# Patient Record
(demographics unavailable — no encounter records)

---

## 2025-02-04 NOTE — HISTORY OF PRESENT ILLNESS
[FreeTextEntry1] : March 8, 2022.  Patient is an 84-year-old man who came to this country from [place] in 1962, was in show business and a dancer and met his wife in a [employer] performance of [employer] 51 years ago.  Healthy with no real heart issues although was followed by Dr. Elvin Yang for a number of years, mostly for hyperlipidemia and did have mild MVP with mild MR on an echo in 2016.  No family history of coronary disease, no hypertension or diabetes although A1c slightly borderline.  On atorvastatin for years with excellent lipid profile.  In November 2021 both he and his wife came down with COVID.  On November 17 the patient fell and fractured his hip and had surgery on the 18th at [facility].  Reportedly in the ER he was in atrial fibrillation and he was placed on Eliquis and Cardizem  mg daily.  Eventually went to rehab and then came home and had his follow-up with Dr. Garces and Dr. Macias.  Rhythm was regular and he was advised to see a cardiologist and referred here.  Here with his wife and he is in sinus rhythm.  In general he denies exertional chest pain, shortness of breath, palpitations, syncope or near syncope.  He was on aspirin and atorvastatin for a previous TIA in 2017 that was mild but reportedly no atrial fibrillation then. March 9, 2022.Received records from [facility].  There is an echo report dated November 22, 2021.  Normal LV size and function with LVEF 65 to 70%.  Diastolic dysfunction present.  Normal RV size and function.  Mild MR.  Mild AI.  No TR.  No pericardial effusion.  Of interest the atrial fibrillation was postop as he initially presented in sinus rhythm.  March 31, 2022.  Patient returns in follow-up.  Is in sinus rhythm at 70.  Low voltage in the limb leads and one VPC. Left anterior hemiblock.  He feels well with no complaints.  He is off the Eliquis but still on the diltiazem.  Blood pressure is excellent. October 4, 2022.  Patient returns in follow-up.  Here with his son and clearly depressed as his wife of more than 50 years passed away about 2 months ago after a long hospitalization.  Still in sinus rhythm at 70 with Q waves in 3 and aVF.  Most recent labs were at the end of May and his hemoglobin A1c was up to 6.8.  Excellent lipid profile then.  His proBNP back in March was elevated and his echo from [facility] did show some diastolic dysfunction and mild MR.  This will be repeated today.  At the end of the visit the son asked about him getting a smart watch and I told him that was a good idea and gave them a sample box of Eliquis to have available in case the watch should tell him that he is back in atrial fibrillation, he could start anticoagulation until he is able to get to a physician's office to confirm. Labs were okay except mild elevation in his LFTs, proBNP around 460, excellent lipid profile with HDL 68 and LDL 50.  Hemoglobin A1c 6.4. He saw Dr. Garces his primary care physician who noted a black lesion on his scalp and referred him to dermatology. April 4, 2023.  Patient returns in follow-up.  Remains in sinus rhythm at 69 with a mild first-degree AV block and otherwise unremarkable except for Q waves in lead III and aVF.  Sinus arrhythmia noted as well.  Patient slow to answer but seems oriented, here with his grandson.  Possibly still depressed and or grieving.  No complaints of chest pain shortness of breath palpitations etc.  Says he had COVID for about 3 weeks around the program at time but not severe and he was not placed on Paxlovid etc. says his appetite is not great but he has not lost weight.  He remains on the same medication so his labs including LFTs will be checked again today.  Last echo August 2016 and had some mitral valve prolapse and regurgitation so this will be repeated with his next visit. August 8, 2023.  Patient returns in follow-up and for echocardiogram.  The echo has normal wall motion, probably no small wall motion abnormality inferiorly, with mild to moderate MR and AI.  Diastolic function still seen.  He has no complaints but he does seem to talk a little slow and maybe a little confusion at times.  We reviewed some of his medications and when to take them and we talked about his diltiazem and his atrial fibrillation.  He is still on Eliquis. August 8, 2024.  Patient returns exactly 1 year later for follow-up and echocardiogram again.  He is now 86 years old.  Here with his grandson.  Perhaps has slowed down a little more and refuses to wear his hearing aids.  No interval medical or cardiac issues except may be COVID.  Denies change in medication.  Blood pressure excellent.  EKG is sinus rhythm at 60 with occasional VPCs.  Mild abnormalities with J-point elevation in V1 and slightly in V2 with some ST depressions sloping downward V4 through 6 all unchanged.  He had carotid Doppler February 14 by neurology and had less than 50% stenosis bilaterally.  He denies exertional chest pain shortness of breath etc.  Now and then the grandson says he complains about being a little dizzy but it totally comes out of the blue and at random, and never near syncopal or with syncope. February 4, 2025.  Patient returns for follow-up and for echocardiogram as the BNP had gone up a little last visit.  He is here with one of the grandsons who does not live with him but does keep involved occasionally.  The patient seems more out of it and less oriented, complaining of generalized weakness, complaining of dizziness but no syncope or near syncope, and no real severe shortness of breath.  He looks a little disheveled.  His daughter has him seeing a new doctor who they think is an immunologist who helped her a lot and he did change some medications possibly stopping his statin, and he did send off a bunch of bloods recently so they did not want blood work today.  His echocardiogram was for the most part unchanged with mild MR, mild to moderate AI, mild TR with PAS 24, normal LV systolic function but abnormal diastolic function, and ascending aorta measuring 4 cm which is unchanged.

## 2025-02-04 NOTE — DISCUSSION/SUMMARY
[FreeTextEntry1] : They will try to get me the notes and the most recent labs from the other physician.  In the meantime I told him that from a cardiac point of view he is stable and he could follow-up here in 6 months. [EKG obtained to assist in diagnosis and management of assessed problem(s)] : EKG obtained to assist in diagnosis and management of assessed problem(s)

## 2025-02-04 NOTE — PHYSICAL EXAM
[Well Developed] : well developed [Well Nourished] : well nourished [No Acute Distress] : no acute distress [Normal Conjunctiva] : normal conjunctiva [Normal Venous Pressure] : normal venous pressure [No Carotid Bruit] : no carotid bruit [Normal S1, S2] : normal S1, S2 [No Rub] : no rub [No Gallop] : no gallop [Clear Lung Fields] : clear lung fields [Good Air Entry] : good air entry [No Respiratory Distress] : no respiratory distress  [Soft] : abdomen soft [Non Tender] : non-tender [No Masses/organomegaly] : no masses/organomegaly [Normal Bowel Sounds] : normal bowel sounds [Normal Gait] : normal gait [No Cyanosis] : no cyanosis [No Clubbing] : no clubbing [No Varicosities] : no varicosities [Normal Radial B/L] : normal radial B/L [Normal PT B/L] : normal PT B/L [Normal DP B/L] : normal DP B/L [No Rash] : no rash [No Skin Lesions] : no skin lesions [Moves all extremities] : moves all extremities [No Focal Deficits] : no focal deficits [Alert and Oriented] : alert and oriented [Normal memory] : normal memory [Lethargic] : lethargic [de-identified] : Slight click and a 1/6 systolic murmur mostly apical.  Soft JENNIE as well. [de-identified] : No ankle edema bilaterally.  Normal pedal pulses today [de-identified] : May be slow speech. [de-identified] : Seems "out of it" and just generalized lethargy and or generalized depression

## 2025-02-04 NOTE — ASSESSMENT
[FreeTextEntry1] : 2022 Overall the patient seems healthy. Most likely the atrial fibrillation was related to the acute stress and trauma of the hip fracture and possibly related to Covid (from records received turns out he presented in sinus rhythm and the atrial fibrillation was only postop). Currently is still in sinus rhythm. The rest of the ECG has a left anterior hemiblock and borderline Q waves in III and aVF; otherwise within normal limits. Usually after a first-time event for A. fib with a clear precipitant, we would continue anticoagulation for 3 months and then reevaluate. At initial visit it was 3 months and the patient and his wife were nervous because of a friend who had an intracerebral bleed from Eliqu. They were not sure if he had an echo while at [facility]. I decided to continue the Cardizem LA but stop the Eliquis. He remained on baby aspirin for his previous TIA. I was able to obtain results of an echo from [facility]--mild MR, mild AI, otherwise normal. He remains in sinus rhythm again here today. He will remain off Eliquis and we discussed back and forth whether to continue Cardizem. Currently tolerating it well, he is 85 years old with some mitral valve disease, so perhaps it will help keep him out of atrial fibrillation. Therefore we will continue it for now and only reevaluate if he develops any side effects etc. no complaints of constipation and no edema so far. Currently I do not feel the need for long-term monitoring yet. Last visit his son asked about using a smart watch which I think is a good idea and I gave them a sample box of Eliquis that he could start if there is a question of atrial fibrillation until he gets to see a physician. Clearly still grieving for his wife who passed away 8 months ago after a marriage of over 50 years. Otherwise seems stable. He had mildly abnormal last time so these will be repeated today. If still abnormal I will let Dr. Garces working up. If no new issues I would like to see him with an echocardiogram in 4 months time. August 8, 2023. Patient returned and seems stable, still a little slowed mental status wise and may be a little confused. No recurrence of atrial fibrillation as far as we can tell. Echo seems mostly unchanged, may be a little more MR and AI. Normal LV and RV function. Does have diastolic dysfunction. Discussed Cardizem and I would continue it for now. Discussed that he can take his statin anytime of day and labs will be checked including lipids and BNP. EKG remains sinus rhythm with Q's in 3 and aVF. Patient returned again August 8, 2024 exactly a year later. Here with his grandson. Seems to have slowed down a little although that is what I said a year ago. Remains in sinus rhythm. Blood pressure is excellent. Denies any chest pain or shortness of breath or near syncope or palpitations. Echo virtually unchanged with normal LV and RV function, mild to moderate AI, mild MVP of the posterior leaflet with mild MR may be mild to moderate. EKG is unchanged with some minor abnormalities as described but sinus rhythm with an occasional VPC. No signs on exam of fluid overload or congestive heart failure.  Patient returns today February 4, 2025 together with one of his grandsons once again.  He has never really recovered since his wife passed away and he seems to be progressively slowing down with somewhat of a more lethargic mental status, complaining about very generalized weakness, some dizziness that is all nonspecific.  His daughter took him to a new doctor, they think it is an immunologist, and he made some changes in his medications and he did some lab work that is pending so they did not want labs done today.  I told the grandson that that doctor has to either call me or send me his notes and lab results so I can understand where things are at.  His physical exam and his EKG are unchanged; the EKG remains abnormal with some mild ST downsloping depressions in leads II, aVF, V4 through 6.  The patient's echo is unchanged; I do not really see a definite prolapse and the valve today but he does have mild MR, there is still normal systolic function and mild diastolic dysfunction.  There is mild TR with PAS 24 and there is mild to moderate AI, mostly unchanged.  I tried to reassure them is much as possible but the patient seems to be going downhill and perhaps it is just severe depression.

## 2025-02-04 NOTE — CARDIOLOGY SUMMARY
[de-identified] : July 6, 2016.  Done at [facility].  Mild prolapse of anterior mitral leaflet with mild MR.  Mildly calcified trileaflet aortic valve with normal opening and no AI.  Aortic root 4.2 cm.  Normal left atrium.  Normal LV size and function with LVEF 64%.  Normal diastolic function.  Normal RV size and function with mild TR.  Normal pericardium with no effusion. November 21, 2017.  Done at [facility].  Normal LV size with LVEF 55 to 60%.  Normal RV size and function.  Normal left atrium.  Normal right atrium.  Mild dilatation of ascending aorta and aortic root.  Trileaflet aortic valve with mild AI.  Trace MR.  No TR.  No pericardial effusion.  November 22, 2021.  Done at [facility].  Normal LV size with LVEF 55 to 60%.  Normal RV size and function.  Mild MR.  Mild AI.  No TR.  No pericardial effusion. August 8, 2023.  Mild mitral prolapse with mild to moderate MR.  Tricuspid aortic valve with normal opening and mild to moderate AI.  Normal left atrium.  Normal LV size and systolic function with LVEF 60%.  Normal RV size and function with trace TR.  No pericardial effusion seen N

## 2025-02-04 NOTE — REVIEW OF SYSTEMS
[Hearing Loss] : hearing loss [Depression] : depression [Negative] : Heme/Lymph [Feeling Fatigued] : feeling fatigued [Weight Loss (___ Lbs)] : [unfilled] ~Ulb weight loss [Weight Gain (___ Lbs)] : no recent weight gain [Lower Ext Edema] : no extremity edema [Suicidal] : not suicidal [FreeTextEntry9] : Fractured right hip and ? still has a little limp. [de-identified] : see HPI

## 2025-04-29 NOTE — PHYSICAL EXAM
[Capable of only limited self care, confined to bed or chair more than 50% of waking hours] : Status 3- Capable of only limited self care, confined to bed or chair more than 50% of waking hours [Thin] : thin [Normal] : affect appropriate [de-identified] : Left clavicle fracture  [de-identified] : uses walker, h/o frequent falls, depedent of most ADLs [de-identified] : b/l knee abrasions, Left shoulder bruise, jaundice  [de-identified] : motor and speech impairment since 2021, dizziness

## 2025-04-29 NOTE — HISTORY OF PRESENT ILLNESS
[de-identified] : Mr. Schwartz is an 88yo M with newly diagnosed Cholangiocarcinoma.  CT AP 4/9/2025 obtained for hyperbilirubinemia which demonstrated mild intrahepatic biliary dilatation with prominent proximal CBD, no obvious mass. Marked dilation of the gallbladder with mild pericholecystic edema.   Follow up MRCP 4/10/2025 :  Mild intra and extrahepatic ductal dilatation.  CBD 0.8cm. Cystic Duct dilated to 1.0 cm.  Focal stenosis of CBD at panc head.  Gallbladder is distended up to 5.2 cm with sludge. Multiple pancreatic cystic lesions communication with main pancreatic duct, c/w sidebranch IPMN. Largest in the body measuring 2.0cm. No main duct dilatation.   EGD/ERCP 4/15/2025 Major papilla appeared to be enlarged Distal CBD stricture seen. Dilated. Brushed and biopsied.  Sludge removed. Small diverticulum. 10 Fr x 7 cm plastic stent.  Multiple cystic lesions in pancreatic body and pancreatic tail.  Bild duct pathology with adenocarcinoma, well to moderately differentiated.   4/24/25 Ca 19-9 136, CEA 4.1

## 2025-04-29 NOTE — HISTORY OF PRESENT ILLNESS
[Jaundice] : jaundice [ERCP] : ERCP [EUS] : EUS [Biopsy] : biopsy performed [Not seen outside] : not seen outside [Nutrition] : Nutrition [Social Work] : Social Work [Palliative Care] : Palliative Care [Capable of only limited self care, confined to bed or chair more than 50% of waking hours] : Status 3 - Capable of only limited self care, confined to bed or chair more than 50% of waking hours [TextBox_4] : 04/09/2025 [TextBox_39] : 80- [id number]  [TextBox_41] : adenocarcinoma (bile duct) [TextBox_43] : 04/15/2025  [Other: ____] : [unfilled] [Resectable] : resectable [TextBox_5] : 04/29/2025 [TextBox_38] : 136 [FreeTextEntry3] : 4.1 [FreeTextEntry4] : 2.2  [TextBox_40] : 04/09/2025 [TextBox_74] : Technically resectable lesion with ?liver met  Not medically a candidate for surgery / chemo  [FreeTextEntry6] : Stent exchange to metal Consider palliative RT  Discuss GOC with patient/ family . [de-identified] : ID: Neymar Schwartz is an 87-year-old man who presents for an initial consultation.    Chronological Hx of Cancer: 04/09/25 Recently presented to [facility] with abdominal pain, nausea, jaundice. Tbili of 11.0 04/09/25 CT CAP showing mild intrahepatic biliary dilatation with prominent proximal CBD; no demonstrable discrete obstruction or mass lesion. Marked gallbladder dilation 04/10/25: MRCP: Mild intra and extra hepatic biliary ductal dilatation with focal stenosis of the CBD at the pancreatic head. C 04/15/25: EUS/ERCP: Distal CBD stricture seen. Dilated. Brushed. Biopsied. Pathology: Bile duct, FNB - Adenocarcinoma, well- to moderately-differentiated 04/28/25: PET/CT: Small FDG-avid focus in region of biliary stent, increased FDG activity in dome of liver, along right lateral aspect of liver, and caudate lobe, is indeterminate.    MMR status (all GI cancers): Tumor Mutation Data (if advanced): Sent today 4/29/25 Germline Data (if pancreas or young): sent 4/29   PMHx/PSHx: T2DM, HTN, TIA in 2017, prostate ca (s/p prostatectomy). Allergies: Denies  Social Hx: - h/o smoking/ ETOH use: Denies.  - place of residency: [place] in home with daughter, Delfino Quach). He has 4 children: Another daughter-Kaye, and 2 sons. Constantin is his HHA.  - Pt is depressed from his wife's death 3 years ago, on bupropion.  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  Fam Hx (alvaro GI cancers): multiple cancer (daughter unsure), No GI malignancy Goals of Care considered: 4/29 Palliative care referral considered: 4/29     [de-identified] : 4/29/25: - s/p multiple falls lately, uses a walker. Newly has a home attendant. - last fall was yesterday, one before that was on Wednesday, he fell down a flight of stairs causing a clavicle fracture seen on recent PET - he could not get up for 15 minutes  - Since Wednesday's fall, he's developed tremors. - He has episodes of dizziness, started in 2021, causing his falls  - He has motor and speech issues which started in 2021 after hip fracture - Now has dysphagia, which got progressively worse over the past 2 years - Pt requires assistance with most ADLs - Sitting most of the day - Denies n/v/d, pain - has constipation, plans to take senna  - pt was able to walk to scale but unsteady on feet - Appetite is declining  - pt is depressed from his wife's death 3 years ago, on bupropion

## 2025-04-29 NOTE — HISTORY OF PRESENT ILLNESS
[Jaundice] : jaundice [ERCP] : ERCP [EUS] : EUS [Biopsy] : biopsy performed [Not seen outside] : not seen outside [Nutrition] : Nutrition [Social Work] : Social Work [Palliative Care] : Palliative Care [Capable of only limited self care, confined to bed or chair more than 50% of waking hours] : Status 3 - Capable of only limited self care, confined to bed or chair more than 50% of waking hours [TextBox_4] : 04/09/2025 [TextBox_39] : 80- [id number]  [TextBox_41] : adenocarcinoma (bile duct) [TextBox_43] : 04/15/2025  [Other: ____] : [unfilled] [Resectable] : resectable [TextBox_5] : 04/29/2025 [TextBox_38] : 136 [FreeTextEntry3] : 4.1 [FreeTextEntry4] : 2.2  [TextBox_40] : 04/09/2025 [TextBox_74] : Technically resectable lesion with ?liver met  Not medically a candidate for surgery / chemo  [FreeTextEntry6] : Stent exchange to metal Consider palliative RT  Discuss GOC with patient/ family . [de-identified] : ID: Neymar Schwartz is an 87-year-old man who presents for an initial consultation.    Chronological Hx of Cancer: 04/09/25 Recently presented to [facility] with abdominal pain, nausea, jaundice. Tbili of 11.0 04/09/25 CT CAP showing mild intrahepatic biliary dilatation with prominent proximal CBD; no demonstrable discrete obstruction or mass lesion. Marked gallbladder dilation 04/10/25: MRCP: Mild intra and extra hepatic biliary ductal dilatation with focal stenosis of the CBD at the pancreatic head. C 04/15/25: EUS/ERCP: Distal CBD stricture seen. Dilated. Brushed. Biopsied. Pathology: Bile duct, FNB - Adenocarcinoma, well- to moderately-differentiated 04/28/25: PET/CT: Small FDG-avid focus in region of biliary stent, increased FDG activity in dome of liver, along right lateral aspect of liver, and caudate lobe, is indeterminate.    MMR status (all GI cancers): Tumor Mutation Data (if advanced): Sent today 4/29/25 Germline Data (if pancreas or young): sent 4/29   PMHx/PSHx: T2DM, HTN, TIA in 2017, prostate ca (s/p prostatectomy). Allergies: Denies  Social Hx: - h/o smoking/ ETOH use: Denies.  - place of residency: [place] in home with daughter, Delfino Quach). He has 4 children: Another daughter-Kaye, and 2 sons. Constantin is his HHA.  - Pt is depressed from his wife's death 3 years ago, on bupropion.  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  Fam Hx (alvaro GI cancers): multiple cancer (daughter unsure), No GI malignancy Goals of Care considered: 4/29 Palliative care referral considered: 4/29     [de-identified] : 4/29/25: - s/p multiple falls lately, uses a walker. Newly has a home attendant. - last fall was yesterday, one before that was on Wednesday, he fell down a flight of stairs causing a clavicle fracture seen on recent PET - he could not get up for 15 minutes  - Since Wednesday's fall, he's developed tremors. - He has episodes of dizziness, started in 2021, causing his falls  - He has motor and speech issues which started in 2021 after hip fracture - Now has dysphagia, which got progressively worse over the past 2 years - Pt requires assistance with most ADLs - Sitting most of the day - Denies n/v/d, pain - has constipation, plans to take senna  - pt was able to walk to scale but unsteady on feet - Appetite is declining  - pt is depressed from his wife's death 3 years ago, on bupropion

## 2025-04-29 NOTE — PHYSICAL EXAM
[Capable of only limited self care, confined to bed or chair more than 50% of waking hours] : Status 3- Capable of only limited self care, confined to bed or chair more than 50% of waking hours [Thin] : thin [Normal] : affect appropriate [de-identified] : Left clavicle fracture  [de-identified] : uses walker, h/o frequent falls, depedent of most ADLs [de-identified] : b/l knee abrasions, Left shoulder bruise, jaundice  [de-identified] : motor and speech impairment since 2021, dizziness

## 2025-04-29 NOTE — ASSESSMENT
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 ECOG: 3, uses walker to ambulate, h/o frequent falls with his last fall being yesterday, he has episodes of dizziness. Pt has a new clavicle fracture, recommended he f/u with his PCP for this, most likely will need a sling for immobilization. Pt has speech and motor impairment since 2021, now with progressive worsening of dysphagia - plans to see speech and swallow this week for this. Discussed his disease staging and prognosis. Given pt's poor performance status, pt is not a candidate for chemotherapy or surgery.  Radiation may be able to slow the spread of the cancer, though comes with its own side effects, and he is not interested in pursuing cancer directed therapy. Discussed that hospice care is a good option to preserve his quality of life. Hospice services reviewed in detail. Pt to have his plastic stent exchanged to a metal stent prior to hospice enrollment to help prevent complications such as cholangitis or disease spread. Foundation one liquid today in the case there is a target treatment option but discussed that it is unlikely we are to find any targetable mutation that will benefit him.  Plan: (Extrahepatic Cholangiocarcinoma)  - Labs including Foundation One Liquid today - Stent exchange with Dr. Hernandes  - Pt to get sling for clavicle fracture and f/u with his PCP - Hospice referral   Estelle Koenig NP Patient was seen and examined with Dr. Saldivar

## 2025-04-29 NOTE — HISTORY OF PRESENT ILLNESS
[Jaundice] : jaundice [ERCP] : ERCP [EUS] : EUS [Biopsy] : biopsy performed [Not seen outside] : not seen outside [Nutrition] : Nutrition [Social Work] : Social Work [Palliative Care] : Palliative Care [Capable of only limited self care, confined to bed or chair more than 50% of waking hours] : Status 3 - Capable of only limited self care, confined to bed or chair more than 50% of waking hours [TextBox_4] : 04/09/2025 [TextBox_39] : 80- [id number]  [TextBox_41] : adenocarcinoma (bile duct) [TextBox_43] : 04/15/2025  [Other: ____] : [unfilled] [Resectable] : resectable [TextBox_5] : 04/29/2025 [TextBox_38] : 136 [FreeTextEntry3] : 4.1 [FreeTextEntry4] : 2.2  [TextBox_40] : 04/09/2025 [TextBox_74] : Technically resectable lesion with ?liver met  Not medically a candidate for surgery / chemo  [FreeTextEntry6] : Stent exchange to metal Consider palliative RT  Discuss GOC with patient/ family . [de-identified] : ID: Neymar Schwartz is an 87-year-old man who presents for an initial consultation.    Chronological Hx of Cancer: 04/09/25 Recently presented to [facility] with abdominal pain, nausea, jaundice. Tbili of 11.0 04/09/25 CT CAP showing mild intrahepatic biliary dilatation with prominent proximal CBD; no demonstrable discrete obstruction or mass lesion. Marked gallbladder dilation 04/10/25: MRCP: Mild intra and extra hepatic biliary ductal dilatation with focal stenosis of the CBD at the pancreatic head. C 04/15/25: EUS/ERCP: Distal CBD stricture seen. Dilated. Brushed. Biopsied. Pathology: Bile duct, FNB - Adenocarcinoma, well- to moderately-differentiated 04/28/25: PET/CT: Small FDG-avid focus in region of biliary stent, increased FDG activity in dome of liver, along right lateral aspect of liver, and caudate lobe, is indeterminate.    MMR status (all GI cancers): Tumor Mutation Data (if advanced): Sent today 4/29/25 Germline Data (if pancreas or young): sent 4/29   PMHx/PSHx: T2DM, HTN, TIA in 2017, prostate ca (s/p prostatectomy). Allergies: Denies  Social Hx: - h/o smoking/ ETOH use: Denies.  - place of residency: [place] in home with daughter, Delfino Quach). He has 4 children: Another daughter-Kaye, and 2 sons. Constantin is his HHA.  - Pt is depressed from his wife's death 3 years ago, on bupropion.  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  Fam Hx (alvaro GI cancers): multiple cancer (daughter unsure), No GI malignancy Goals of Care considered: 4/29 Palliative care referral considered: 4/29     [de-identified] : 4/29/25: - s/p multiple falls lately, uses a walker. Newly has a home attendant. - last fall was yesterday, one before that was on Wednesday, he fell down a flight of stairs causing a clavicle fracture seen on recent PET - he could not get up for 15 minutes  - Since Wednesday's fall, he's developed tremors. - He has episodes of dizziness, started in 2021, causing his falls  - He has motor and speech issues which started in 2021 after hip fracture - Now has dysphagia, which got progressively worse over the past 2 years - Pt requires assistance with most ADLs - Sitting most of the day - Denies n/v/d, pain - has constipation, plans to take senna  - pt was able to walk to scale but unsteady on feet - Appetite is declining  - pt is depressed from his wife's death 3 years ago, on bupropion

## 2025-04-29 NOTE — PHYSICAL EXAM
[Capable of only limited self care, confined to bed or chair more than 50% of waking hours] : Status 3- Capable of only limited self care, confined to bed or chair more than 50% of waking hours [Thin] : thin [Normal] : affect appropriate [de-identified] : Left clavicle fracture  [de-identified] : uses walker, h/o frequent falls, depedent of most ADLs [de-identified] : b/l knee abrasions, Left shoulder bruise, jaundice  [de-identified] : motor and speech impairment since 2021, dizziness

## 2025-04-29 NOTE — REASON FOR VISIT
[Initial MDC Visit] : an initial MDC visit for [Initial Consultation] : an initial consultation [FreeTextEntry2] : cholangiocarcinoma

## 2025-04-29 NOTE — END OF VISIT
[FreeTextEntry3] : Shawn Saldivar MD PhD Medical Oncology Attending I personally have spent a total of 60 minutes of time on the date of this encounter reviewing test results, documenting findings, coordinating care, and directly consulting with the patient and/or designated family member.

## 2025-04-29 NOTE — ASSESSMENT
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 ECOG: 3, uses walker to ambulate, h/o frequent falls with his last fall being yesterday, he has episodes of dizziness. Pt has a new clavicle fracture, recommended he f/u with his PCP for this, most likely will need a sling for immobilization. Pt has speech and motor impairment since 2021, now with progressive worsening of dysphagia - plans to see speech and swallow this week for this. Discussed his disease staging and prognosis. Given pt's poor performance status, pt is not a candidate for chemotherapy or surgery.  Radiation may be able to slow the spread of the cancer, though comes with its own side effects, and he is not interested in pursuing cancer directed therapy. Discussed that hospice care is a good option to preserve his quality of life. Hospice services reviewed in detail. Pt to have his plastic stent exchanged to a metal stent prior to hospice enrollment to help prevent complications such as cholangitis or disease spread. Foundation one liquid today in the case there is a target treatment option but discussed that it is unlikely we are to find any targetable mutation that will benefit him.  Plan: (Extrahepatic Cholangiocarcinoma)  - Labs including Foundation One Liquid today - Stent exchange with Dr. Hernandes  - Pt to get sling for clavicle fracture and f/u with his PCP - Hospice referral   Estelle Koenig NP Patient was seen and examined with Dr. Saldivar

## 2025-04-29 NOTE — REASON FOR VISIT
Spoke with Alicia with [facility]. Sent script over and she reported it can take 24-48 hours for the order to enter into their system. Once order is processed, they will reach out to patient for consent and payment.    They will reach out to our office to set up delivery date.     [Initial MDC Visit] : an initial MDC visit for [Initial Consultation] : an initial consultation [FreeTextEntry2] : cholangiocarcinoma

## 2025-04-29 NOTE — HISTORY OF PRESENT ILLNESS
[de-identified] : Mr. Schwartz is an 86yo M with newly diagnosed Cholangiocarcinoma.  CT AP 4/9/2025 obtained for hyperbilirubinemia which demonstrated mild intrahepatic biliary dilatation with prominent proximal CBD, no obvious mass. Marked dilation of the gallbladder with mild pericholecystic edema.   Follow up MRCP 4/10/2025 :  Mild intra and extrahepatic ductal dilatation.  CBD 0.8cm. Cystic Duct dilated to 1.0 cm.  Focal stenosis of CBD at panc head.  Gallbladder is distended up to 5.2 cm with sludge. Multiple pancreatic cystic lesions communication with main pancreatic duct, c/w sidebranch IPMN. Largest in the body measuring 2.0cm. No main duct dilatation.   EGD/ERCP 4/15/2025 Major papilla appeared to be enlarged Distal CBD stricture seen. Dilated. Brushed and biopsied.  Sludge removed. Small diverticulum. 10 Fr x 7 cm plastic stent.  Multiple cystic lesions in pancreatic body and pancreatic tail.  Bild duct pathology with adenocarcinoma, well to moderately differentiated.   4/24/25 Ca 19-9 136, CEA 4.1

## 2025-04-29 NOTE — ASSESSMENT
[FreeTextEntry1] : 87M with suspected distal cholangiocarcinoma.  Potentially resectable, though unclear whether FDG avidity in the dome of the liver represents metastatic disease.  ECOG 3.   We discussed natural history of cholangiocarcinoma and treatment strategy.   We discussed role of surgery as well as potential for short and long-term complications and anticipated recovery.   Patient prefers to avoid invasive procedures. Would like to focus more on quality of life.   Discussed that his plastic stent should be changed to a metal stent at some point in the near future.   Discussed potential role for radiation.   He will follow with me on an as needed basis.

## 2025-04-29 NOTE — REVIEW OF SYSTEMS
[Fatigue] : fatigue [Recent Change In Weight] : ~T recent weight change [Diarrhea: Grade 0] : Diarrhea: Grade 0 [Dizziness] : dizziness [Difficulty Walking] : difficulty walking [Depression] : depression [Negative] : Allergic/Immunologic [Fever] : no fever [Chills] : no chills [Night Sweats] : no night sweats [Confused] : no confusion [Fainting] : no fainting [Suicidal] : not suicidal [Insomnia] : no insomnia [Anxiety] : no anxiety [Swollen Glands] : no swollen glands [FreeTextEntry9] : frequent falls [de-identified] : b/l knee abrasions from fall  [de-identified] : dizziness [de-identified] : bruise on left shoulder